# Patient Record
Sex: MALE | URBAN - METROPOLITAN AREA
[De-identification: names, ages, dates, MRNs, and addresses within clinical notes are randomized per-mention and may not be internally consistent; named-entity substitution may affect disease eponyms.]

---

## 2018-03-02 ENCOUNTER — IMPORTED ENCOUNTER (OUTPATIENT)
Dept: URBAN - METROPOLITAN AREA CLINIC 1 | Facility: CLINIC | Age: 68
End: 2018-03-02

## 2018-03-02 PROBLEM — H25.813: Noted: 2018-03-02

## 2018-03-02 PROBLEM — H40.1131: Noted: 2018-03-02

## 2018-03-02 PROBLEM — H01.001: Noted: 2018-03-02

## 2018-03-02 PROBLEM — H01.004: Noted: 2018-03-02

## 2018-03-02 PROCEDURE — 92133 CPTRZD OPH DX IMG PST SGM ON: CPT

## 2018-03-02 PROCEDURE — 92004 COMPRE OPH EXAM NEW PT 1/>: CPT

## 2018-03-02 NOTE — PATIENT DISCUSSION
1.  Mild Open Angle Glaucoma OU-(0.75/0.70)  IOP was 19/19. Target IOP 14-16. FM HX. Patient to start gtt regimen sample of Travatan Z QHS OU. Patient advised to be compliant with gtts. Condition was discussed with patient and patient understands. Will continue to monitor patient for any progression in condition. Patient was advised to call us with any problems questions or concerns. OCT was done today results was moderate thinning OU superior OD and superior/temp OS. 2.  Cataract OU: Observe for now without intervention. The patient was advised to contact us if any change or worsening of vision3. Anterior Blepharitis OU - Daily warm compresses and lid scrubs were recommended. 4. Return for an appointment in 2-3 weeks for 10 and VF 24-2 with Dr. Seth Suarez.

## 2018-03-26 ENCOUNTER — IMPORTED ENCOUNTER (OUTPATIENT)
Dept: URBAN - METROPOLITAN AREA CLINIC 1 | Facility: CLINIC | Age: 68
End: 2018-03-26

## 2018-03-26 PROBLEM — H40.1111: Noted: 2018-03-26

## 2018-03-26 PROBLEM — H40.1122: Noted: 2018-03-26

## 2018-03-26 PROCEDURE — 92083 EXTENDED VISUAL FIELD XM: CPT

## 2018-03-26 PROCEDURE — 99213 OFFICE O/P EST LOW 20 MIN: CPT

## 2018-03-26 NOTE — PATIENT DISCUSSION
1.  Moderate Open Angle Glaucoma OS/Mild OD (CD 0.75/0.70) - IOP improved 165 OU. Cont Travatan Z QHS OU (erx). Patient advised to be compliant with gtts. Condition was discussed with patient and patient understands. Will continue to monitor patient for any progression in condition. Patient was advised to call us with any problems questions or concerns. 2.  Cataract OU: Observe for now without intervention. The patient was advised to contact us if any change or worsening of vision3. Anterior Blepharitis OU - Daily warm compresses and lid scrubs were recommended. Return for an appointment in 4 months 10 (IOP check) with Dr. Emily Banerjee.

## 2018-06-01 NOTE — PATIENT DISCUSSION
(H20.00) Unspecified acute and subacute iridocyclitis - Assesment : Examination revealed acute iridocyclitis OD. - Plan : Start Prednisolone  0.1% OD TID through weekend, BID OD times one week, QD times one week. If no improvement in ten days call office. Warned of possible side effects of steroidal gtts. RTC PRN or sooner  if changes or worsens.

## 2018-07-09 ENCOUNTER — IMPORTED ENCOUNTER (OUTPATIENT)
Dept: URBAN - METROPOLITAN AREA CLINIC 1 | Facility: CLINIC | Age: 68
End: 2018-07-09

## 2018-07-09 PROBLEM — H40.1122: Noted: 2018-07-09

## 2018-07-09 PROBLEM — H40.1111: Noted: 2018-07-09

## 2018-07-09 PROBLEM — H01.002: Noted: 2018-07-09

## 2018-07-09 PROBLEM — H25.813: Noted: 2018-07-09

## 2018-07-09 PROBLEM — H01.005: Noted: 2018-07-09

## 2018-07-09 PROCEDURE — 99213 OFFICE O/P EST LOW 20 MIN: CPT

## 2018-07-09 NOTE — PATIENT DISCUSSION
1.  Mild Open Angle Glaucoma OD (0.75): Controlled IOP on Travatan Z OU QHS CPM. Patient advised to be compliant with gtts. Condition was discussed with patient and patient understands. Will continue to monitor patient for any progression in condition. Patient was advised to call us with any problems questions or concerns. 2.  Moderate Open Angle Glaucoma OS (0.7): Controlled IOP on Travatan Z OU QHS CPM. Patient advised to be compliant with gtts. Condition was discussed with patient and patient understands. Will continue to monitor patient for any progression in condition. Patient was advised to call us with any problems questions or concerns. 3.  Cataract OU: Observe for now without intervention. The patient was advised to contact us if any change or worsening of vision4. Blepharitis anterior type OU- Controlled. Continue daily warm compresses and lid scrubs were recommended. 5. Return for an appointment for a 10/IOP check OU in 4 months with Dr. Kim Fitzgerald.

## 2018-07-09 NOTE — PATIENT DISCUSSION
Moderate Open Angle Glaucoma OS -Patient to continue with current gtt regimen. Patient advised to be compliant with gtts. Condition was discussed with patient and patient understands. Will continue to monitor patient for any progression in condition. Patient was advised to call us with any problems questions or concerns.

## 2018-09-11 NOTE — PATIENT DISCUSSION
(T79.187) Keratoconjunct sicca, not specified as Sjogren's, bilateral - Assesment : Examination revealed Dry Eye Syndrome - Plan : Monitor for changes. ATs recommended 2-4 times per day or prn.

## 2018-09-11 NOTE — PATIENT DISCUSSION
(H25.13) Age-related nuclear cataract, bilateral - Assesment : Examination revealed cataract. Mild symptoms. - Plan : Monitor for changes. Advised patient of condition of cataracts and discussed symptoms of cataracts worsening and becoming more bothersome. Updated GLRx given today. Pt to call if vision changes or becomes more bothered by visual symptoms before next appt.

## 2018-09-11 NOTE — PATIENT DISCUSSION
(H40.013) Open angle with borderline findings, low risk, bilateral - Assesment : Examination revealed suspicion for Open Angle Glaucoma. OCT ONH performed: wnl and stable today. - Plan : Monitor for IOP and NFL changes with visits and OCTs. RTC in 1 year for Exam and OCT ONH, sooner if problems or changes occur.

## 2018-09-11 NOTE — PATIENT DISCUSSION
(H11.041) Peripheral pterygium, stationary, right eye - Assesment : Examination revealed Pterygium nasally OD. - Plan : Monitor for changes. Okay to continue Prednisolone 1% OD prn when irritation or redness occurs. ATs prn for comfort. Wear sunglasses. Call if becomes bothersome.

## 2018-11-09 ENCOUNTER — IMPORTED ENCOUNTER (OUTPATIENT)
Dept: URBAN - METROPOLITAN AREA CLINIC 1 | Facility: CLINIC | Age: 68
End: 2018-11-09

## 2018-11-09 PROBLEM — H40.1122: Noted: 2018-11-09

## 2018-11-09 PROBLEM — H40.1111: Noted: 2018-11-09

## 2018-11-09 PROCEDURE — 99213 OFFICE O/P EST LOW 20 MIN: CPT

## 2018-11-09 NOTE — PATIENT DISCUSSION
1.  Mild Open Angle Glaucoma OD (0.75): Controlled IOP on Travatan Z OU QHS CPM. Patient advised to be compliant with gtts. Condition was discussed with patient and patient understands. Will continue to monitor patient for any progression in condition. Patient was advised to call us with any problems questions or concerns. 2.  Moderate Open Angle Glaucoma OS (0.7): Controlled IOP on Travatan Z OU QHS CPM. Patient advised to be compliant with gtts. Condition was discussed with patient and patient understands. Will continue to monitor patient for any progression in condition. Patient was advised to call us with any problems questions or concerns. 3.  Return for an appointment in March for 30/oct with Dr. Emily Banerjee.

## 2019-03-04 ENCOUNTER — IMPORTED ENCOUNTER (OUTPATIENT)
Dept: URBAN - METROPOLITAN AREA CLINIC 1 | Facility: CLINIC | Age: 69
End: 2019-03-04

## 2019-03-04 PROBLEM — H01.004: Noted: 2019-03-04

## 2019-03-04 PROBLEM — H25.813: Noted: 2019-03-04

## 2019-03-04 PROBLEM — H40.1111: Noted: 2019-03-04

## 2019-03-04 PROBLEM — H01.001: Noted: 2019-03-04

## 2019-03-04 PROBLEM — H40.1122: Noted: 2019-03-04

## 2019-03-04 PROCEDURE — 92014 COMPRE OPH EXAM EST PT 1/>: CPT

## 2019-03-04 PROCEDURE — 92133 CPTRZD OPH DX IMG PST SGM ON: CPT

## 2019-03-04 PROCEDURE — 92015 DETERMINE REFRACTIVE STATE: CPT

## 2019-03-04 NOTE — PATIENT DISCUSSION
1.  Mild Open Angle Glaucoma OD/Moderate OS: (CD 0.80/0.75) - IOP elevated today 22/26. Goal IOP: 14-16 OU. OCT shows progression OU. Patient has been using Travatan Z QHS OU as directed. Hold Travatan Z. Will try Vyzulta QHS OU. (Sample given today of Vyzulta). Will recheck IOP in 3 weeks. Condition was discussed with patient and patient understands. Will continue to monitor patient for any progression in condition. Patient was advised to call us with any problems questions or concerns. 2.  Cataract OU: Observe for now without intervention. The patient was advised to contact us if any change or worsening of vision3. Anterior Blepharitis OU - Daily warm compresses and lid scrubs were recommended. Finalized Glasses MRx today. Return for an appointment in 3 weeks 10 (IOP check) with Dr. Al Mistry.

## 2019-03-25 ENCOUNTER — IMPORTED ENCOUNTER (OUTPATIENT)
Dept: URBAN - METROPOLITAN AREA CLINIC 1 | Facility: CLINIC | Age: 69
End: 2019-03-25

## 2019-03-25 PROBLEM — H01.001: Noted: 2019-03-25

## 2019-03-25 PROBLEM — H40.1122: Noted: 2019-03-25

## 2019-03-25 PROBLEM — H40.1111: Noted: 2019-03-25

## 2019-03-25 PROBLEM — H25.813: Noted: 2019-03-25

## 2019-03-25 PROBLEM — H01.004: Noted: 2019-03-25

## 2019-03-25 PROCEDURE — 92012 INTRM OPH EXAM EST PATIENT: CPT

## 2019-03-25 NOTE — PATIENT DISCUSSION
1.  Mild Open Angle Glaucoma OD/Moderate OS -- (CD 0.80/0.75) - IOP elevated today 18/25. Goal IOP: 14-16 OU. No improvement with IOP on trial of Vyzulta. D/c Vyzulta. Restart Travatan Z QHS OU. Will have patient try Rhopressa QHS OU in addition to the Travatan Z. (Sample of Rhopressa given today). Will recheck IOP in 3 weeks. Condition was discussed with patient and patient understands. Will continue to monitor patient for any progression in condition. Patient was advised to call us with any problems questions or concerns. 2.  Cataract OU -- Observe for now without intervention. The patient was advised to contact us if any change or worsening of vision3. Anterior Blepharitis OU -- Daily Hot compresses and lid scrubs were recommended. Return for an appointment in 3 weeks for a 10 with Dr. Mary Chatterjee.

## 2019-04-15 ENCOUNTER — IMPORTED ENCOUNTER (OUTPATIENT)
Dept: URBAN - METROPOLITAN AREA CLINIC 1 | Facility: CLINIC | Age: 69
End: 2019-04-15

## 2019-04-15 PROBLEM — H01.001: Noted: 2019-04-15

## 2019-04-15 PROBLEM — H25.813: Noted: 2019-04-15

## 2019-04-15 PROBLEM — H01.004: Noted: 2019-04-15

## 2019-04-15 PROBLEM — H40.1111: Noted: 2019-04-15

## 2019-04-15 PROBLEM — H40.1122: Noted: 2019-04-15

## 2019-04-15 PROCEDURE — 92012 INTRM OPH EXAM EST PATIENT: CPT

## 2019-04-15 NOTE — PATIENT DISCUSSION
1.  Mild Open Angle Glaucoma OD/Moderate OS -- (CD 0.80/0.75) -  IOP today stable and within goal range today: 14 OU. Goal IOP: 14-16 OU. Patient doing well w/ Travatan Z QHS OU and sample Rhopressa QHS OU. Previously no improvement with IOP on trial of Vyzulta. Continue Travatan Z QHS and Rhopressa QHS OU. (Erx) Patient advised to be compliant with gtts. Condition was discussed with patient and patient understands. Will continue to monitor patient for any progression in condition. Patient was advised to call us with any problems questions or concerns. 2.  Cataract OU -- Observe for now without intervention. The patient was advised to contact us if any change or worsening of vision3. Anterior Blepharitis OU -- Continue daily hot compresses and lid scrubs were recommended. Return for an appointment in 4 months for a 10 HVF with Dr. Lizette Hicks.

## 2019-04-15 NOTE — PATIENT DISCUSSION
Cataract OU: Observe for now without intervention.  The patient was advised to contact us if any change or worsening of vision Simple: Patient demonstrates quick and easy understanding/Verbalized Understanding

## 2019-06-03 ENCOUNTER — IMPORTED ENCOUNTER (OUTPATIENT)
Dept: URBAN - METROPOLITAN AREA CLINIC 1 | Facility: CLINIC | Age: 69
End: 2019-06-03

## 2019-06-03 PROBLEM — H10.45: Noted: 2019-06-03

## 2019-06-03 PROCEDURE — 92012 INTRM OPH EXAM EST PATIENT: CPT

## 2019-06-03 NOTE — PATIENT DISCUSSION
1.  Allergic Conjunctivitis OU -- likely secondary to Rhopressa. D/c Rhopressa. Will have patient start Combigan. Condition discussed with patient. Recommend OTC ATs OU PRN. 2.  Mild Open Angle Glaucoma OD/Moderate OS -- (CD 0.80/0.75) -- IOP 19/21 elevated today despite compliance. Goal IOP: 14-16 OU. Will have patient continue Travatan Z QHS and d/c Rhopressa (secondary to possibly allergic reaction). Start Combigan BID OU (sample given). Previous failed trial of Vyzulta. Patient advised to be compliant with gtts. Condition was discussed with patient and patient understands. Will continue to monitor patient for any progression in condition. Patient was advised to call us with any problems questions or concerns. 2.  Cataract OU -- Observe3. Anterior Blepharitis OU Return for an appointment in 3 weeks for a 10 (IOP check) with Dr. Wale Galindo.

## 2019-06-25 ENCOUNTER — IMPORTED ENCOUNTER (OUTPATIENT)
Dept: URBAN - METROPOLITAN AREA CLINIC 1 | Facility: CLINIC | Age: 69
End: 2019-06-25

## 2019-06-25 PROBLEM — H10.011: Noted: 2019-06-25

## 2019-06-25 PROBLEM — H10.13: Noted: 2019-06-25

## 2019-06-25 PROCEDURE — 92012 INTRM OPH EXAM EST PATIENT: CPT

## 2019-06-25 NOTE — PATIENT DISCUSSION
1.  Acute Atopic Allergic Conjunctivitis OU (likely secondary to Rhopressa) -- Resolved after d/c Rhopressa. Continue Combigan BID OU (Sample Given & ERx'd). Condition discussed with patient. Recommend continue the frequent use of OTC AT's OU PRN. 2.  Mild Open Angle Glaucoma OD / Moderate Open Angle Glaucoma OS (CD: 0.80 / 0.75) -- IOP improved to 17 / 18 today. IOP T-MAX: 22 / 26. Goal IOP: 14-16 OU. Continue Travatan-Z QHS. Continue Combigan BID OU (Sample Given & ERx'd). Previous failed trial of Vyzulta. **Rhopressa Allergy**. Patient advised to be compliant with gtts. Condition was discussed with patient and patient understands. Will continue to monitor patient for any progression in condition. Patient was advised to call us with any problems questions or concerns. 3.  Cataracts OU -- Observe4. Anterior Blepharitis OU -- Recommend Hot Moist Compress and Lid Scrubs / Baby Shampoo QHS OU PRN. Return as scheduled on 8/14/19 for 10 / 24-2 HVF OU appointment with Dr. Rosaura Rodriguez.

## 2019-08-14 ENCOUNTER — IMPORTED ENCOUNTER (OUTPATIENT)
Dept: URBAN - METROPOLITAN AREA CLINIC 1 | Facility: CLINIC | Age: 69
End: 2019-08-14

## 2019-08-14 PROBLEM — H40.1111: Noted: 2019-08-14

## 2019-08-14 PROBLEM — H40.1122: Noted: 2019-08-14

## 2019-08-14 PROBLEM — H25.813: Noted: 2019-08-14

## 2019-08-14 PROBLEM — H01.001: Noted: 2019-08-14

## 2019-08-14 PROBLEM — H01.004: Noted: 2019-08-14

## 2019-08-14 PROCEDURE — 92083 EXTENDED VISUAL FIELD XM: CPT

## 2019-08-14 PROCEDURE — 99213 OFFICE O/P EST LOW 20 MIN: CPT

## 2019-08-14 NOTE — PATIENT DISCUSSION
1.  Mild Open Angle Glaucoma OD/Moderate OS (CD 0.80/0.75) - No progression shown on HVF. IOP improved from previous visit. Cont Combigan BID OU and Travatan Z QHS OU. T-MAX: 22/26. Goal IOP Range: 14-16 OU. Previous failed trial of Vyzulta. **Rhopressa Allergy**. Patient advised to be compliant with gtts. 2.  Cataract OU: Observe for now without intervention. The patient was advised to contact us if any change or worsening of vision3. Anterior Blepharitis OU - Daily Hot compresses and lid scrubs were recommended. Return for an appointment in 4 months 10 (IOP check) with Dr. John Paul Menon.

## 2019-12-04 ENCOUNTER — IMPORTED ENCOUNTER (OUTPATIENT)
Dept: URBAN - METROPOLITAN AREA CLINIC 1 | Facility: CLINIC | Age: 69
End: 2019-12-04

## 2019-12-04 PROBLEM — H40.1111: Noted: 2019-12-04

## 2019-12-04 PROBLEM — H01.004: Noted: 2019-12-04

## 2019-12-04 PROBLEM — H25.813: Noted: 2019-12-04

## 2019-12-04 PROBLEM — H01.001: Noted: 2019-12-04

## 2019-12-04 PROBLEM — H40.1122: Noted: 2019-12-04

## 2019-12-04 PROCEDURE — 92012 INTRM OPH EXAM EST PATIENT: CPT

## 2019-12-04 NOTE — PATIENT DISCUSSION
1.  Mild Open Angle Glaucoma OD/Moderate OS (CD 0.80/0.75) -- IOP stable today 14/13. Cont Combigan BID OU and Travatan Z QHS OU (erx'd). T-MAX: 22/26. Goal IOP Range: 14-16 OU. Previous failed trial of Vyzulta. **Rhopressa Allergy**. Patient advised to be compliant with gtts. 2.  Cataract OU -- Observe for now without intervention. The patient was advised to contact us if any change or worsening of vision3. Anterior Blepharitis OU -- Daily Hot compresses and lid scrubs were recommended. Return for an appointment in March for a 30/OCT with Dr. Mary Chatterjee.

## 2020-01-08 ENCOUNTER — IMPORTED ENCOUNTER (OUTPATIENT)
Dept: URBAN - METROPOLITAN AREA CLINIC 1 | Facility: CLINIC | Age: 70
End: 2020-01-08

## 2020-01-08 PROBLEM — H40.1122: Noted: 2020-01-08

## 2020-01-08 PROBLEM — H40.1111: Noted: 2020-01-08

## 2020-01-08 PROBLEM — H10.45: Noted: 2020-01-08

## 2020-01-08 PROCEDURE — 92012 INTRM OPH EXAM EST PATIENT: CPT

## 2020-01-08 NOTE — PATIENT DISCUSSION
1.  Allergic Atopic Conjunctivitis OU : Likely Combigan allergy d/c the use off combigan. Condition discussed with patient. 2.  Mild Open Angle Glaucoma OD/Moderate OS (CD 0.80/0.75) -- IOP stable today 15 OU. D/c Combigan BID OU. Begin Timolol gel QAM OU (Sample of Timolol gel given) Cont  Travatan Z QHS OU (erx'd). T-MAX: 22/26. Goal IOP Range: 14-16 OU. Previous failed trial of Vyzulta (No improvement in IOP). **Rhopressa Allergy**. Patient advised to be compliant with gtts. 3.  Cataract OU -- Observe4. JIMI OU-- Recommend At's BID OU (sample of Refresh Relieva given)routinely wait 5 minutes between all drops. 5.  Anterior Blepharitis OU -- Daily Hot compresses and lid scrubs were recommended. Return for an appointment in 3 weeks 10/IOP check/K check with Dr. Camille Jauregui.

## 2020-01-29 ENCOUNTER — IMPORTED ENCOUNTER (OUTPATIENT)
Dept: URBAN - METROPOLITAN AREA CLINIC 1 | Facility: CLINIC | Age: 70
End: 2020-01-29

## 2020-01-29 PROBLEM — H40.1111: Noted: 2020-01-29

## 2020-01-29 PROBLEM — H04.123: Noted: 2020-01-29

## 2020-01-29 PROBLEM — H25.813: Noted: 2020-01-29

## 2020-01-29 PROCEDURE — 99213 OFFICE O/P EST LOW 20 MIN: CPT

## 2020-01-29 NOTE — PATIENT DISCUSSION
1.  Mild Open Angle Glaucoma OD(CD 0.80/0.75) -- IOP stable today 15 OU. Cont Timolol gel QAM OU and Travatan Z QHS OU. T-MAX: 22/26. Goal IOP Range: 14-16 OU. Previous failed trial of Vyzulta (No improvement in IOP). **Rhopressa Combigan Allergy**. Patient advised to be compliant with gtts. 3.  Cataract OU -- Observe4. JIMI OU -- Recommend AT's BID-TID OU routinely wait 5 minutes between all drops. 5.  Anterior Blepharitis OU -- Cont Hot compresses and lid scrubs were recommended. Return for an appointment in May 30/oct/glare with Dr. Laurel Reyes.

## 2020-06-23 ENCOUNTER — IMPORTED ENCOUNTER (OUTPATIENT)
Dept: URBAN - METROPOLITAN AREA CLINIC 1 | Facility: CLINIC | Age: 70
End: 2020-06-23

## 2020-06-23 PROBLEM — H25.813: Noted: 2020-06-23

## 2020-06-23 PROBLEM — H01.001: Noted: 2020-06-23

## 2020-06-23 PROBLEM — H40.1122: Noted: 2020-06-23

## 2020-06-23 PROBLEM — H10.45: Noted: 2020-06-23

## 2020-06-23 PROBLEM — H04.123: Noted: 2020-06-23

## 2020-06-23 PROBLEM — H01.004: Noted: 2020-06-23

## 2020-06-23 PROBLEM — H40.1111: Noted: 2020-06-23

## 2020-06-23 PROCEDURE — 92015 DETERMINE REFRACTIVE STATE: CPT

## 2020-06-23 PROCEDURE — 92133 CPTRZD OPH DX IMG PST SGM ON: CPT

## 2020-06-23 PROCEDURE — 92014 COMPRE OPH EXAM EST PT 1/>: CPT

## 2020-06-23 NOTE — PATIENT DISCUSSION
Cataract OU:  Visually Significant discussed the risks benefits alternatives and limitations of cataract surgery. The patient stated a full understanding and a desire to proceed with the procedure. The patient will need to return for preop appointment with cataract measurements and to have any additional questions answered and start pre-operative eye drops as directed. Phaco PCL OS first then ODOtherwise follow-up

## 2020-06-23 NOTE — PATIENT DISCUSSION
1.  Mild Open Angle Glaucoma OD/ Moderate OS (0.8/0.75)-No progression by OCT IOP stable. Patient to continue with Timolol 0.5% ou bid and Travatan ou qhs. . IOP goal range 14-16 ou. Patient advised to be compliant with gtts. Condition was discussed with patient and patient understands. Will continue to monitor patient for any progression in condition. Patient was advised to call us with any problems questions or concerns. 2.  Cataract OU:  Visually Significant discussed the risks benefits alternatives and limitations of cataract surgery. The patient stated a full understanding and a desire to proceed with the procedure. The patient will need to return for preop appointment with cataract measurements and to have any additional questions answered and start pre-operative eye drops as directed. Phaco PCL OS first then ODOtherwise follow-up3. Dry Eyes OU -The use/continuation of artificial tears were recommended. 4.  Anterior Blepharitis OU - Daily Hot compresses and lid scrubs were recommended. 5. Return for an appointment for Phaco w/ PCL OS then OD with Dr. Wyatt.

## 2020-07-13 ENCOUNTER — IMPORTED ENCOUNTER (OUTPATIENT)
Dept: URBAN - METROPOLITAN AREA CLINIC 1 | Facility: CLINIC | Age: 70
End: 2020-07-13

## 2020-07-13 PROBLEM — H25.813: Noted: 2020-07-13

## 2020-07-13 PROBLEM — H40.1111: Noted: 2020-07-13

## 2020-07-13 PROBLEM — H40.1122: Noted: 2020-07-13

## 2020-07-13 PROCEDURE — 92136 OPHTHALMIC BIOMETRY: CPT

## 2020-07-13 PROCEDURE — 92012 INTRM OPH EXAM EST PATIENT: CPT

## 2020-07-13 NOTE — PATIENT DISCUSSION
1.  Cataract OU - Visually Significant secondary to glare OU discussed the risks benefits alternatives and limitations of cataract surgery. The patient stated a full understanding and a desire to proceed with the procedure. Discussed with patient if PO Gtts are more than $120 for all three combined when filling at their Pharmacy please call our office to request generic substitutions. Pt came to pre-op appointment today alone and Lifestyle Questionnaire Completed. Phaco PCL OS then ODPhaco PCL OS2. Mild Open Angle Glaucoma OD/ Moderate OS (0.8/0.85)-  IOP stable. Patient to continue with Timolol 0.5% BID OU and Travatan QHS OU. IOP goal range 14-16 OU. Patient advised to be compliant with gtts. Condition was discussed with patient and patient understands. Will continue to monitor patient for any progression in condition. Patient was advised to call us with any problems questions or concerns. 3.  Dry Eyes OU - The use/continuation of artificial tears were recommended. 4.  Anterior Blepharitis OU - Daily Hot compresses and lid scrubs were recommended. f/u as scheduled

## 2020-07-22 ENCOUNTER — IMPORTED ENCOUNTER (OUTPATIENT)
Dept: URBAN - METROPOLITAN AREA CLINIC 1 | Facility: CLINIC | Age: 70
End: 2020-07-22

## 2020-07-23 ENCOUNTER — IMPORTED ENCOUNTER (OUTPATIENT)
Dept: URBAN - METROPOLITAN AREA CLINIC 1 | Facility: CLINIC | Age: 70
End: 2020-07-23

## 2020-07-23 PROBLEM — Z96.1: Noted: 2020-07-23

## 2020-07-23 PROCEDURE — 99024 POSTOP FOLLOW-UP VISIT: CPT

## 2020-07-23 NOTE — PATIENT DISCUSSION
POD#1 CE/IOL Toric w/ LenSx OS w/ iStent doing well. Francine Upson in place. Use Prolensa Qdaily OS Ocuflox TID OS: Use gtts through completion of PO gtt chart regimen/Per our instructions given to patient. Post op Warnings Reiterated Cont Timolol 0.5% BID OU Travatan Z QHS OU.  RTC as scheduled

## 2020-07-30 ENCOUNTER — IMPORTED ENCOUNTER (OUTPATIENT)
Dept: URBAN - METROPOLITAN AREA CLINIC 1 | Facility: CLINIC | Age: 70
End: 2020-07-30

## 2020-07-30 PROBLEM — H25.811: Noted: 2020-07-30

## 2020-07-30 PROCEDURE — 92136 OPHTHALMIC BIOMETRY: CPT

## 2020-07-30 NOTE — PATIENT DISCUSSION
1.  Cataract OD: Visually Significant secondary to glare discussed the risks benefits alternatives and limitations of cataract surgery. The patient stated a full understanding and a desire to proceed with the procedure. Discussed with patient if PO Gtts are more than $120 for all three combined when filling at their Pharmacy please call our office to request generic substitutions. Phaco PCL OD 2. POW#3  CE/IOL OS (Toric w/ LenSx OS w/ iStent) doing well. (Dextenza out)  Use Prednisolone BID OS & Prolensa Qdaily OS: Use through completion of po gtt regimen. Cont Timolol 0.5% BID OU Travatan Z QHS OU.  F/u as scheduled 2nd eye

## 2020-08-05 ENCOUNTER — IMPORTED ENCOUNTER (OUTPATIENT)
Dept: URBAN - METROPOLITAN AREA CLINIC 1 | Facility: CLINIC | Age: 70
End: 2020-08-05

## 2020-08-06 ENCOUNTER — IMPORTED ENCOUNTER (OUTPATIENT)
Dept: URBAN - METROPOLITAN AREA CLINIC 1 | Facility: CLINIC | Age: 70
End: 2020-08-06

## 2020-08-06 PROBLEM — Z96.1: Noted: 2020-08-06

## 2020-08-06 PROCEDURE — 99024 POSTOP FOLLOW-UP VISIT: CPT

## 2020-08-06 NOTE — PATIENT DISCUSSION
1. POD#1 Phaco/ PCL OD (Toric w/ LenSx)- doing well. **DextenzaUse Prolensa Qdaily OD Ocuflox TID OD (Fill in po gtt names): Use all three gtts through completion of PO gtt chart regimen/ Per our instructions given. Post op Warnings Reiterated 2. POW#3 Phaco/ PCL OS (Toric w/ LenSx w/ iStent)- doing well  Use Prednisolone BID OS & Prolensa Qdaily OS: Use through completion of po gtt regimen. Cont Timolol 0.5% BID OU Travatan Z QHS OU.  RTC as scheduled

## 2020-08-31 ENCOUNTER — IMPORTED ENCOUNTER (OUTPATIENT)
Dept: URBAN - METROPOLITAN AREA CLINIC 1 | Facility: CLINIC | Age: 70
End: 2020-08-31

## 2020-08-31 PROBLEM — Z96.1: Noted: 2020-08-31

## 2020-08-31 PROCEDURE — 99024 POSTOP FOLLOW-UP VISIT: CPT

## 2020-08-31 NOTE — PATIENT DISCUSSION
POM#1 CE/IOL OU (Toric w/ LenSx OU w/ iStent OS) doing well. *Dextenza internalizedContinue Prolensa QD until gone. MRX for glasses given. Return for an appointment in January 10/[facility] with Dr. Nirmal Pantoja.

## 2021-01-22 ENCOUNTER — IMPORTED ENCOUNTER (OUTPATIENT)
Dept: URBAN - METROPOLITAN AREA CLINIC 1 | Facility: CLINIC | Age: 71
End: 2021-01-22

## 2021-01-22 PROBLEM — H16.143: Noted: 2021-01-22

## 2021-01-22 PROBLEM — H01.001: Noted: 2021-01-22

## 2021-01-22 PROBLEM — H40.1122: Noted: 2021-01-22

## 2021-01-22 PROBLEM — H26.492: Noted: 2021-01-22

## 2021-01-22 PROBLEM — H40.1111: Noted: 2021-01-22

## 2021-01-22 PROBLEM — H01.004: Noted: 2021-01-22

## 2021-01-22 PROBLEM — Z96.1: Noted: 2021-01-22

## 2021-01-22 PROBLEM — H04.123: Noted: 2021-01-22

## 2021-01-22 PROCEDURE — 92012 INTRM OPH EXAM EST PATIENT: CPT

## 2021-01-22 PROCEDURE — 92083 EXTENDED VISUAL FIELD XM: CPT

## 2021-01-22 NOTE — PATIENT DISCUSSION
1.  Mild Open Angle Glaucoma OD/Moderate OS (CD 0.80/0.85) - HVF WNL OD no progression shown OS. IOP stable continue Timolol 0.5% BID OU and Travatan Z QHS OU. S/p iStent OS. **Rhopressa and Combigan allergy. Patient advised to be compliant with gtts. Condition was discussed with patient and patient understands. Will continue to monitor patient for any progression in condition. Patient was advised to call us with any problems questions or concerns. 2.  JIMI w/ PEK OU- Recommend ATs TID OU routinely 3. PCO  OS: (Posterior Capsule Opacification)   Observe and consider yag cap when pt feels pco visually significant and visual acuity decreases to appropriate level. 4. Pseudophakia OU - (Toric w/ LenSx OU w/ iStent OS)5. Anterior Blepharitis OU - Daily Hot compresses and lid scrubs were recommended. Patient deferred Manifest Rx today. Return for an appointment in 6 months 30/OCT/glare with Dr. Beltran Saravia.

## 2021-07-30 ENCOUNTER — IMPORTED ENCOUNTER (OUTPATIENT)
Dept: URBAN - METROPOLITAN AREA CLINIC 1 | Facility: CLINIC | Age: 71
End: 2021-07-30

## 2021-07-30 PROBLEM — H40.1111: Noted: 2021-07-30

## 2021-07-30 PROBLEM — H40.1122: Noted: 2021-07-30

## 2021-07-30 PROBLEM — H16.143: Noted: 2021-07-30

## 2021-07-30 PROBLEM — H04.123: Noted: 2021-07-30

## 2021-07-30 PROCEDURE — 99214 OFFICE O/P EST MOD 30 MIN: CPT

## 2021-07-30 PROCEDURE — 92133 CPTRZD OPH DX IMG PST SGM ON: CPT

## 2021-07-30 NOTE — PATIENT DISCUSSION
1.  Mild Open Angle Glaucoma OD/Moderate OS (CD 0.80/0.85) - OCT shows no progression OU. IOP stable continue Timolol 0.5% BID OU and Travatan Z QHS OU. S/p iStent OS. **Rhopressa and Combigan allergy. Patient advised to be compliant with gtts. Condition was discussed with patient and patient understands. Will continue to monitor patient for any progression in condition. Patient was advised to call us with any problems questions or concerns. 2.  JIMI w/ PEK OU -- Increase ATs to TID OU routinely. Consider Restasis or possibly changing Timolol if no improvement on regular AT usage. 3. PCO OS (Posterior Capsule Opacification) -- Observe and consider yag cap when pt feels pco visually significant and visual acuity decreases to appropriate level. 4. Pseudophakia OU -- (Toric w/ LenSx OU w/ iStent OS)5. Anterior Blepharitis OU -- Daily Hot compresses and lid scrubs were recommended. Patient deferred Manifest Rx today. Return for an appointment in 6 month 10/HVF with Dr. María Elena Live.

## 2022-01-31 ENCOUNTER — IMPORTED ENCOUNTER (OUTPATIENT)
Dept: URBAN - METROPOLITAN AREA CLINIC 1 | Facility: CLINIC | Age: 72
End: 2022-01-31

## 2022-01-31 PROBLEM — H04.123: Noted: 2022-01-31

## 2022-01-31 PROBLEM — H40.1132: Noted: 2022-01-31

## 2022-01-31 PROBLEM — H16.143: Noted: 2022-01-31

## 2022-01-31 PROBLEM — H40.1111: Noted: 2022-01-31

## 2022-01-31 PROBLEM — H40.1122: Noted: 2022-01-31

## 2022-01-31 PROCEDURE — 92012 INTRM OPH EXAM EST PATIENT: CPT

## 2022-01-31 PROCEDURE — 92083 EXTENDED VISUAL FIELD XM: CPT

## 2022-01-31 NOTE — PATIENT DISCUSSION
1.  Mild Open Angle Glaucoma OD/Moderate OS (CD 0.80/0.85) - HVF today WNL OD Superior defect OS without progression continue Timolol 0.5% BID OU and Travatan Z QHS OU. S/p iStent OS. **H/o Rhopressa and Combigan allergy. Patient advised to be compliant with gtts. Condition was discussed with patient and patient understands. Will continue to monitor patient for any progression in condition. Patient was advised to call us with any problems questions or concerns. 2.  JIMI w/ PEK OU -- PEK improved with ATs to TID OU routinely. Cont routine use of ATs TID-QID OU. Can consider Restasis or changing Timolol in future if no improvement on regular AT usage however will hold off for now. 3.  PCO OS -- (Posterior Capsule Opacification) Observe and consider yag cap when pt feels pco visually significant and visual acuity decreases to appropriate level. 4. Pseudophakia OU -- (Toric w/ LenSx OU w/ iStent OS)5. Anterior Blepharitis OU -- Daily Hot compresses and lid scrubs were recommended. Return for an appointment in 6 month 30/OCT/glare with Dr. Ye Mott.

## 2022-04-02 ASSESSMENT — TONOMETRY
OD_IOP_MMHG: 19
OD_IOP_MMHG: 14
OD_IOP_MMHG: 15
OD_IOP_MMHG: 13
OS_IOP_MMHG: 14
OD_IOP_MMHG: 14
OS_IOP_MMHG: 18
OD_IOP_MMHG: 14
OD_IOP_MMHG: 15
OS_IOP_MMHG: 15
OD_IOP_MMHG: 17
OD_IOP_MMHG: 19
OD_IOP_MMHG: 15
OS_IOP_MMHG: 15
OD_IOP_MMHG: 14
OD_IOP_MMHG: 13
OD_IOP_MMHG: 15
OD_IOP_MMHG: 16
OS_IOP_MMHG: 16
OS_IOP_MMHG: 10
OD_IOP_MMHG: 13
OS_IOP_MMHG: 13
OS_IOP_MMHG: 13
OD_IOP_MMHG: 14
OS_IOP_MMHG: 14
OS_IOP_MMHG: 26
OS_IOP_MMHG: 12
OD_IOP_MMHG: 12
OS_IOP_MMHG: 13
OD_IOP_MMHG: 16
OS_IOP_MMHG: 16
OD_IOP_MMHG: 18
OS_IOP_MMHG: 12
OS_IOP_MMHG: 19
OD_IOP_MMHG: 22
OS_IOP_MMHG: 16
OS_IOP_MMHG: 21
OS_IOP_MMHG: 13
OD_IOP_MMHG: 16
OD_IOP_MMHG: 14
OS_IOP_MMHG: 25
OS_IOP_MMHG: 12
OS_IOP_MMHG: 16
OS_IOP_MMHG: 15

## 2022-04-02 ASSESSMENT — VISUAL ACUITY
OD_SC: 20/20
OD_SC: 20/40
OS_CC: 20/20
OD_CC: 20/25
OS_SC: 20/30
OD_CC: J1-2
OD_CC: J2
OS_SC: 20/20
OD_CC: J2
OD_SC: 20/25-1
OD_CC: 20/20
OS_SC: 20/20
OS_CC: J2
OD_CC: 20/20
OD_SC: 20/20-2
OS_GLARE: 20/30
OD_CC: 20/20
OD_GLARE: 20/30
OS_CC: 20/20
OD_CC: 20/20
OS_SC: 20/25-2
OD_CC: J1
OS_SC: 20/30
OS_GLARE: 20/80
OD_CC: 20/20
OD_SC: 20/25-1
OD_GLARE: 20/60
OS_CC: J1
OS_CC: 20/25
OS_SC: 20/30
OD_SC: 20/20
OD_GLARE: 20/80
OS_GLARE: 20/60
OD_GLARE: 20/60
OD_SC: 20/20
OD_CC: 20/25
OS_SC: 20/25
OS_CC: 20/25
OS_CC: 20/20
OS_SC: 20/20-1
OD_CC: 20/20
OS_CC: 20/25
OS_SC: 20/20
OS_CC: J1-2
OD_SC: 20/40
OS_CC: 20/20
OS_GLARE: 20/80
OS_GLARE: 20/60
OS_CC: J2
OD_SC: 20/30-2
OD_GLARE: 20/80
OS_CC: 20/20
OS_SC: 20/25
OS_SC: 20/25-2
OS_CC: 20/20
OS_SC: 20/20-1
OS_CC: 20/25
OD_SC: 20/30-2
OD_SC: 20/20-1
OD_SC: 20/25+2

## 2022-04-02 ASSESSMENT — KERATOMETRY
OS_AXISANGLE_DEGREES: 035
OD_K2POWER_DIOPTERS: 43.00
OS_AXISANGLE_DEGREES: 155
OS_AXISANGLE2_DEGREES: 065
OD_AXISANGLE_DEGREES: 011
OS_AXISANGLE2_DEGREES: 125
OD_K1POWER_DIOPTERS: 43.50
OD_AXISANGLE2_DEGREES: 101
OS_K2POWER_DIOPTERS: 42.50
OD_K2POWER_DIOPTERS: 42.75
OS_K2POWER_DIOPTERS: 43.75
OD_AXISANGLE_DEGREES: 027
OS_K1POWER_DIOPTERS: 43.75
OD_AXISANGLE2_DEGREES: 117
OD_K1POWER_DIOPTERS: 43.25
OS_K1POWER_DIOPTERS: 43.25

## 2022-08-04 ENCOUNTER — ESTABLISHED PATIENT (OUTPATIENT)
Dept: URBAN - METROPOLITAN AREA CLINIC 1 | Facility: CLINIC | Age: 72
End: 2022-08-04

## 2022-08-04 DIAGNOSIS — H16.143: ICD-10-CM

## 2022-08-04 DIAGNOSIS — Z96.1: ICD-10-CM

## 2022-08-04 DIAGNOSIS — H01.021: ICD-10-CM

## 2022-08-04 DIAGNOSIS — H04.123: ICD-10-CM

## 2022-08-04 DIAGNOSIS — H01.024: ICD-10-CM

## 2022-08-04 DIAGNOSIS — H40.1132: ICD-10-CM

## 2022-08-04 DIAGNOSIS — H26.493: ICD-10-CM

## 2022-08-04 PROCEDURE — 92133 CPTRZD OPH DX IMG PST SGM ON: CPT

## 2022-08-04 PROCEDURE — 99214 OFFICE O/P EST MOD 30 MIN: CPT

## 2022-08-04 ASSESSMENT — VISUAL ACUITY
OD_SC: 20/20
OS_SC: J3
OS_SC: 20/25
OD_BAT: 20/30
OD_SC: J3
OS_BAT: 20/40

## 2022-08-04 ASSESSMENT — TONOMETRY
OD_IOP_MMHG: 13
OS_IOP_MMHG: 13

## 2022-08-04 NOTE — PATIENT DISCUSSION
(CD 0.80/0.85) No significant change by OCT OU. Continue Timolol 0.5% BID OU and Travatan Z QHS OU. S/p iStent OS. **H/o Rhopressa and Combigan allergy. Patient advised to be compliant with gtts. Condition was discussed with patient and patient understands. Will continue to monitor patient for any progression in condition. Patient was advised to call us with any problems questions or concerns.

## 2023-04-20 ENCOUNTER — POST-OP (OUTPATIENT)
Dept: URBAN - METROPOLITAN AREA CLINIC 1 | Facility: CLINIC | Age: 73
End: 2023-04-20

## 2023-04-20 DIAGNOSIS — Z98.890: ICD-10-CM

## 2023-04-20 PROCEDURE — 99024 POSTOP FOLLOW-UP VISIT: CPT

## 2023-04-20 ASSESSMENT — TONOMETRY
OS_IOP_MMHG: 10
OD_IOP_MMHG: 10

## 2023-04-20 ASSESSMENT — VISUAL ACUITY
OS_SC: J5
OS_SC: 20/20-2
OD_SC: 20/20
OD_SC: J5

## 2023-04-27 ENCOUNTER — EMERGENCY VISIT (OUTPATIENT)
Dept: URBAN - METROPOLITAN AREA CLINIC 1 | Facility: CLINIC | Age: 73
End: 2023-04-27

## 2023-04-27 DIAGNOSIS — S05.00XA: ICD-10-CM

## 2023-04-27 PROCEDURE — 99213 OFFICE O/P EST LOW 20 MIN: CPT

## 2023-04-27 ASSESSMENT — TONOMETRY
OS_IOP_MMHG: 11
OD_IOP_MMHG: 11

## 2023-04-27 ASSESSMENT — VISUAL ACUITY
OS_SC: 20/20-1
OD_SC: 20/20

## 2023-05-02 ENCOUNTER — FOLLOW UP (OUTPATIENT)
Dept: URBAN - METROPOLITAN AREA CLINIC 1 | Facility: CLINIC | Age: 73
End: 2023-05-02

## 2023-05-02 DIAGNOSIS — S05.00XA: ICD-10-CM

## 2023-05-02 PROCEDURE — 92012 INTRM OPH EXAM EST PATIENT: CPT

## 2023-05-02 ASSESSMENT — VISUAL ACUITY
OS_SC: 20/20-2
OD_SC: 20/20

## 2023-05-02 ASSESSMENT — TONOMETRY
OD_IOP_MMHG: 12
OS_IOP_MMHG: 12

## 2023-10-06 ENCOUNTER — COMPREHENSIVE EXAM (OUTPATIENT)
Dept: URBAN - METROPOLITAN AREA CLINIC 1 | Facility: CLINIC | Age: 73
End: 2023-10-06

## 2023-10-06 DIAGNOSIS — H16.143: ICD-10-CM

## 2023-10-06 DIAGNOSIS — H40.1132: ICD-10-CM

## 2023-10-06 DIAGNOSIS — H04.123: ICD-10-CM

## 2023-10-06 DIAGNOSIS — H01.024: ICD-10-CM

## 2023-10-06 DIAGNOSIS — Z96.1: ICD-10-CM

## 2023-10-06 DIAGNOSIS — H01.021: ICD-10-CM

## 2023-10-06 PROCEDURE — 92133 CPTRZD OPH DX IMG PST SGM ON: CPT

## 2023-10-06 PROCEDURE — 99214 OFFICE O/P EST MOD 30 MIN: CPT

## 2023-10-06 ASSESSMENT — VISUAL ACUITY
OS_SC: J5
OD_SC: J5
OS_SC: 20/20-2
OD_SC: 20/20-1

## 2023-10-06 ASSESSMENT — TONOMETRY
OD_IOP_MMHG: 12
OS_IOP_MMHG: 12

## 2024-04-12 ENCOUNTER — FOLLOW UP (OUTPATIENT)
Dept: URBAN - METROPOLITAN AREA CLINIC 1 | Facility: CLINIC | Age: 74
End: 2024-04-12

## 2024-04-12 DIAGNOSIS — H40.1132: ICD-10-CM

## 2024-04-12 PROCEDURE — 92083 EXTENDED VISUAL FIELD XM: CPT

## 2024-04-12 PROCEDURE — 99213 OFFICE O/P EST LOW 20 MIN: CPT

## 2024-04-12 ASSESSMENT — TONOMETRY
OD_IOP_MMHG: 11
OS_IOP_MMHG: 11

## 2024-04-12 ASSESSMENT — VISUAL ACUITY
OS_SC: 20/20
OD_SC: 20/20

## 2024-11-01 ENCOUNTER — COMPREHENSIVE EXAM (OUTPATIENT)
Dept: URBAN - METROPOLITAN AREA CLINIC 1 | Facility: CLINIC | Age: 74
End: 2024-11-01

## 2024-11-01 DIAGNOSIS — H01.021: ICD-10-CM

## 2024-11-01 DIAGNOSIS — H16.143: ICD-10-CM

## 2024-11-01 DIAGNOSIS — H01.024: ICD-10-CM

## 2024-11-01 DIAGNOSIS — H40.1132: ICD-10-CM

## 2024-11-01 DIAGNOSIS — Z96.1: ICD-10-CM

## 2024-11-01 DIAGNOSIS — H04.123: ICD-10-CM

## 2024-11-01 PROCEDURE — 92133 CPTRZD OPH DX IMG PST SGM ON: CPT

## 2024-11-01 PROCEDURE — 99214 OFFICE O/P EST MOD 30 MIN: CPT

## 2025-05-15 ENCOUNTER — FOLLOW UP (OUTPATIENT)
Age: 75
End: 2025-05-15

## 2025-05-15 DIAGNOSIS — H01.021: ICD-10-CM

## 2025-05-15 DIAGNOSIS — H40.1132: ICD-10-CM

## 2025-05-15 DIAGNOSIS — H01.024: ICD-10-CM

## 2025-05-15 PROCEDURE — 92083 EXTENDED VISUAL FIELD XM: CPT

## 2025-05-15 PROCEDURE — 99213 OFFICE O/P EST LOW 20 MIN: CPT
